# Patient Record
Sex: FEMALE | Race: WHITE | NOT HISPANIC OR LATINO | Employment: FULL TIME | URBAN - METROPOLITAN AREA
[De-identification: names, ages, dates, MRNs, and addresses within clinical notes are randomized per-mention and may not be internally consistent; named-entity substitution may affect disease eponyms.]

---

## 2017-06-06 ENCOUNTER — HOSPITAL ENCOUNTER (OUTPATIENT)
Dept: RADIOLOGY | Facility: MEDICAL CENTER | Age: 47
End: 2017-06-06
Attending: OBSTETRICS & GYNECOLOGY
Payer: COMMERCIAL

## 2017-06-06 DIAGNOSIS — Z12.31 VISIT FOR SCREENING MAMMOGRAM: ICD-10-CM

## 2017-06-06 PROCEDURE — 77063 BREAST TOMOSYNTHESIS BI: CPT

## 2017-10-11 ENCOUNTER — HOSPITAL ENCOUNTER (OUTPATIENT)
Facility: MEDICAL CENTER | Age: 47
End: 2017-10-11
Payer: COMMERCIAL

## 2017-10-11 LAB
25(OH)D3 SERPL-MCNC: 28 NG/ML (ref 30–100)
ALBUMIN SERPL BCP-MCNC: 4.3 G/DL (ref 3.2–4.9)
ALBUMIN/GLOB SERPL: 1.6 G/DL
ALP SERPL-CCNC: 70 U/L (ref 30–99)
ALT SERPL-CCNC: 16 U/L (ref 2–50)
ANION GAP SERPL CALC-SCNC: 8 MMOL/L (ref 0–11.9)
AST SERPL-CCNC: 21 U/L (ref 12–45)
BASOPHILS # BLD AUTO: 0.9 % (ref 0–1.8)
BASOPHILS # BLD: 0.05 K/UL (ref 0–0.12)
BILIRUB SERPL-MCNC: 0.6 MG/DL (ref 0.1–1.5)
BUN SERPL-MCNC: 16 MG/DL (ref 8–22)
CALCIUM SERPL-MCNC: 9.8 MG/DL (ref 8.5–10.5)
CHLORIDE SERPL-SCNC: 102 MMOL/L (ref 96–112)
CHOLEST SERPL-MCNC: 168 MG/DL (ref 100–199)
CO2 SERPL-SCNC: 27 MMOL/L (ref 20–33)
CREAT SERPL-MCNC: 0.85 MG/DL (ref 0.5–1.4)
EOSINOPHIL # BLD AUTO: 0.26 K/UL (ref 0–0.51)
EOSINOPHIL NFR BLD: 4.6 % (ref 0–6.9)
ERYTHROCYTE [DISTWIDTH] IN BLOOD BY AUTOMATED COUNT: 43.9 FL (ref 35.9–50)
GFR SERPL CREATININE-BSD FRML MDRD: >60 ML/MIN/1.73 M 2
GLOBULIN SER CALC-MCNC: 2.7 G/DL (ref 1.9–3.5)
GLUCOSE SERPL-MCNC: 90 MG/DL (ref 65–99)
HCT VFR BLD AUTO: 42.7 % (ref 37–47)
HDLC SERPL-MCNC: 75 MG/DL
HGB BLD-MCNC: 14.2 G/DL (ref 12–16)
IMM GRANULOCYTES # BLD AUTO: 0.01 K/UL (ref 0–0.11)
IMM GRANULOCYTES NFR BLD AUTO: 0.2 % (ref 0–0.9)
LDLC SERPL CALC-MCNC: 82 MG/DL
LYMPHOCYTES # BLD AUTO: 1.8 K/UL (ref 1–4.8)
LYMPHOCYTES NFR BLD: 31.5 % (ref 22–41)
MCH RBC QN AUTO: 32.3 PG (ref 27–33)
MCHC RBC AUTO-ENTMCNC: 33.3 G/DL (ref 33.6–35)
MCV RBC AUTO: 97.3 FL (ref 81.4–97.8)
MONOCYTES # BLD AUTO: 0.45 K/UL (ref 0–0.85)
MONOCYTES NFR BLD AUTO: 7.9 % (ref 0–13.4)
NEUTROPHILS # BLD AUTO: 3.14 K/UL (ref 2–7.15)
NEUTROPHILS NFR BLD: 54.9 % (ref 44–72)
NRBC # BLD AUTO: 0 K/UL
NRBC BLD AUTO-RTO: 0 /100 WBC
PLATELET # BLD AUTO: 259 K/UL (ref 164–446)
PMV BLD AUTO: 10.4 FL (ref 9–12.9)
POTASSIUM SERPL-SCNC: 4.2 MMOL/L (ref 3.6–5.5)
PROT SERPL-MCNC: 7 G/DL (ref 6–8.2)
RBC # BLD AUTO: 4.39 M/UL (ref 4.2–5.4)
SODIUM SERPL-SCNC: 137 MMOL/L (ref 135–145)
TRIGL SERPL-MCNC: 57 MG/DL (ref 0–149)
TSH SERPL DL<=0.005 MIU/L-ACNC: 1.58 UIU/ML (ref 0.3–3.7)
WBC # BLD AUTO: 5.7 K/UL (ref 4.8–10.8)

## 2017-11-03 ENCOUNTER — HOSPITAL ENCOUNTER (OUTPATIENT)
Dept: LAB | Facility: MEDICAL CENTER | Age: 47
End: 2017-11-03
Attending: ANESTHESIOLOGY
Payer: COMMERCIAL

## 2017-11-03 LAB
HCG SERPL QL: NEGATIVE
HCT VFR BLD AUTO: 42.4 % (ref 37–47)

## 2017-11-03 PROCEDURE — 84703 CHORIONIC GONADOTROPIN ASSAY: CPT

## 2017-11-03 PROCEDURE — 36415 COLL VENOUS BLD VENIPUNCTURE: CPT

## 2017-11-03 PROCEDURE — 85014 HEMATOCRIT: CPT

## 2018-03-12 ENCOUNTER — PATIENT OUTREACH (OUTPATIENT)
Dept: HEALTH INFORMATION MANAGEMENT | Facility: OTHER | Age: 48
End: 2018-03-12

## 2018-04-22 ENCOUNTER — OFFICE VISIT (OUTPATIENT)
Dept: URGENT CARE | Facility: CLINIC | Age: 48
End: 2018-04-22
Payer: COMMERCIAL

## 2018-04-22 VITALS
BODY MASS INDEX: 20.14 KG/M2 | OXYGEN SATURATION: 98 % | DIASTOLIC BLOOD PRESSURE: 72 MMHG | SYSTOLIC BLOOD PRESSURE: 112 MMHG | HEIGHT: 64 IN | HEART RATE: 87 BPM | RESPIRATION RATE: 15 BRPM | TEMPERATURE: 99.6 F | WEIGHT: 118 LBS

## 2018-04-22 DIAGNOSIS — R51.9 SINUS HEADACHE: ICD-10-CM

## 2018-04-22 DIAGNOSIS — J01.00 ACUTE MAXILLARY SINUSITIS, RECURRENCE NOT SPECIFIED: ICD-10-CM

## 2018-04-22 PROCEDURE — 99203 OFFICE O/P NEW LOW 30 MIN: CPT | Performed by: NURSE PRACTITIONER

## 2018-04-22 RX ORDER — DOXYCYCLINE HYCLATE 100 MG
100 TABLET ORAL 2 TIMES DAILY
Qty: 20 TAB | Refills: 0 | Status: SHIPPED | OUTPATIENT
Start: 2018-04-22 | End: 2018-05-02

## 2018-04-22 RX ORDER — HYDROCODONE BITARTRATE AND ACETAMINOPHEN 5; 325 MG/1; MG/1
1-2 TABLET ORAL EVERY 6 HOURS PRN
Qty: 10 TAB | Refills: 0 | Status: SHIPPED | OUTPATIENT
Start: 2018-04-22 | End: 2018-04-26

## 2018-04-22 ASSESSMENT — ENCOUNTER SYMPTOMS
FEVER: 0
SINUS PAIN: 1
SINUS PRESSURE: 1
CHILLS: 0

## 2018-04-22 NOTE — PROGRESS NOTES
"Subjective:      Cali Horne is a 47 y.o. female who presents with Sinus Problem (pressure head aches x 1 week )    History reviewed. No pertinent past medical history.  Social History     Social History   • Marital status:      Spouse name: N/A   • Number of children: N/A   • Years of education: N/A     Occupational History   • Not on file.     Social History Main Topics   • Smoking status: Never Smoker   • Smokeless tobacco: Never Used   • Alcohol use Yes      Comment: occ   • Drug use: No   • Sexual activity: Not on file     Other Topics Concern   • Not on file     Social History Narrative   • No narrative on file     History reviewed. No pertinent family history.    Allergies: Penicillins and Sulfa drugs            Sinus Problem   This is a new problem. The current episode started 1 to 4 weeks ago. The problem has been gradually worsening since onset. There has been no fever. Associated symptoms include congestion and sinus pressure. Pertinent negatives include no chills. Past treatments include nothing. The treatment provided no relief.       Review of Systems   Constitutional: Positive for malaise/fatigue. Negative for chills and fever.   HENT: Positive for congestion, sinus pain and sinus pressure.    Skin: Negative.    All other systems reviewed and are negative.         Objective:     /72   Pulse 87   Temp 37.6 °C (99.6 °F)   Resp 15   Ht 1.626 m (5' 4\")   Wt 53.5 kg (118 lb)   SpO2 98%   BMI 20.25 kg/m²      Physical Exam   Constitutional: She is oriented to person, place, and time. She appears well-developed and well-nourished.   HENT:   Head: Normocephalic.   Right Ear: External ear normal.   Left Ear: External ear normal.   Yellow PND  Tender ovet  The maxillary sinuses    Eyes: Conjunctivae and EOM are normal. Pupils are equal, round, and reactive to light.   Neck: Neck supple.   Cardiovascular: Normal rate and regular rhythm.    Pulmonary/Chest: Effort normal and breath sounds " normal.   Musculoskeletal: Normal range of motion.   Neurological: She is alert and oriented to person, place, and time.   Skin: Skin is warm and dry. Capillary refill takes less than 2 seconds.   Psychiatric: She has a normal mood and affect. Her behavior is normal. Judgment and thought content normal.   Vitals reviewed.              Assessment/Plan:     1. Acute maxillary sinusitis, recurrence not specified  2. Sinus headache  -doxycycline  -flonase  -ibuprofen  -mucinex  -norco PRN headache; clearly stated no driving or alcohol with this medication. Checked patient's  and find no evidence of narcotic misuse. Consents signed.  -follow up for persistent or wor seing of symptoms

## 2018-04-22 NOTE — PATIENT INSTRUCTIONS
OTC: mucinex, flonase    Sinusitis, Adult  Sinusitis is soreness and inflammation of your sinuses. Sinuses are hollow spaces in the bones around your face. Your sinuses are located:  · Around your eyes.  · In the middle of your forehead.  · Behind your nose.  · In your cheekbones.  Your sinuses and nasal passages are lined with a stringy fluid (mucus). Mucus normally drains out of your sinuses. When your nasal tissues become inflamed or swollen, the mucus can become trapped or blocked so air cannot flow through your sinuses. This allows bacteria, viruses, and funguses to grow, which leads to infection.  Sinusitis can develop quickly and last for 7?10 days (acute) or for more than 12 weeks (chronic). Sinusitis often develops after a cold.  What are the causes?  This condition is caused by anything that creates swelling in the sinuses or stops mucus from draining, including:  · Allergies.  · Asthma.  · Bacterial or viral infection.  · Abnormally shaped bones between the nasal passages.  · Nasal growths that contain mucus (nasal polyps).  · Narrow sinus openings.  · Pollutants, such as chemicals or irritants in the air.  · A foreign object stuck in the nose.  · A fungal infection. This is rare.  What increases the risk?  The following factors may make you more likely to develop this condition:  · Having allergies or asthma.  · Having had a recent cold or respiratory tract infection.  · Having structural deformities or blockages in your nose or sinuses.  · Having a weak immune system.  · Doing a lot of swimming or diving.  · Overusing nasal sprays.  · Smoking.  What are the signs or symptoms?  The main symptoms of this condition are pain and a feeling of pressure around the affected sinuses. Other symptoms include:  · Upper toothache.  · Earache.  · Headache.  · Bad breath.  · Decreased sense of smell and taste.  · A cough that may get worse at night.  · Fatigue.  · Fever.  · Thick drainage from your nose. The drainage  is often green and it may contain pus (purulent).  · Stuffy nose or congestion.  · Postnasal drip. This is when extra mucus collects in the throat or back of the nose.  · Swelling and warmth over the affected sinuses.  · Sore throat.  · Sensitivity to light.  How is this diagnosed?  This condition is diagnosed based on symptoms, a medical history, and a physical exam. To find out if your condition is acute or chronic, your health care provider may:  · Look in your nose for signs of nasal polyps.  · Tap over the affected sinus to check for signs of infection.  · View the inside of your sinuses using an imaging device that has a light attached (endoscope).  If your health care provider suspects that you have chronic sinusitis, you may also:  · Be tested for allergies.  · Have a sample of mucus taken from your nose (nasal culture) and checked for bacteria.  · Have a mucus sample examined to see if your sinusitis is related to an allergy.  If your sinusitis does not respond to treatment and it lasts longer than 8 weeks, you may have an MRI or CT scan to check your sinuses. These scans also help to determine how severe your infection is.  In rare cases, a bone biopsy may be done to rule out more serious types of fungal sinus disease.  How is this treated?  Treatment for sinusitis depends on the cause and whether your condition is chronic or acute. If a virus is causing your sinusitis, your symptoms will go away on their own within 10 days. You may be given medicines to relieve your symptoms, including:  · Topical nasal decongestants. They shrink swollen nasal passages and let mucus drain from your sinuses.  · Antihistamines. These drugs block inflammation that is triggered by allergies. This can help to ease swelling in your nose and sinuses.  · Topical nasal corticosteroids. These are nasal sprays that ease inflammation and swelling in your nose and sinuses.  · Nasal saline washes. These rinses can help to get rid of  thick mucus in your nose.  If your condition is caused by bacteria, you will be given an antibiotic medicine. If your condition is caused by a fungus, you will be given an antifungal medicine.  Surgery may be needed to correct underlying conditions, such as narrow nasal passages. Surgery may also be needed to remove polyps.  Follow these instructions at home:  Medicines  · Take, use, or apply over-the-counter and prescription medicines only as told by your health care provider. These may include nasal sprays.  · If you were prescribed an antibiotic medicine, take it as told by your health care provider. Do not stop taking the antibiotic even if you start to feel better.  Hydrate and Humidify  · Drink enough water to keep your urine clear or pale yellow. Staying hydrated will help to thin your mucus.  · Use a cool mist humidifier to keep the humidity level in your home above 50%.  · Inhale steam for 10-15 minutes, 3-4 times a day or as told by your health care provider. You can do this in the bathroom while a hot shower is running.  · Limit your exposure to cool or dry air.  Rest  · Rest as much as possible.  · Sleep with your head raised (elevated).  · Make sure to get enough sleep each night.  General instructions  · Apply a warm, moist washcloth to your face 3-4 times a day or as told by your health care provider. This will help with discomfort.  · Wash your hands often with soap and water to reduce your exposure to viruses and other germs. If soap and water are not available, use hand .  · Do not smoke. Avoid being around people who are smoking (secondhand smoke).  · Keep all follow-up visits as told by your health care provider. This is important.  Contact a health care provider if:  · You have a fever.  · Your symptoms get worse.  · Your symptoms do not improve within 10 days.  Get help right away if:  · You have a severe headache.  · You have persistent vomiting.  · You have pain or swelling around  your face or eyes.  · You have vision problems.  · You develop confusion.  · Your neck is stiff.  · You have trouble breathing.  This information is not intended to replace advice given to you by your health care provider. Make sure you discuss any questions you have with your health care provider.  Document Released: 12/18/2006 Document Revised: 08/13/2017 Document Reviewed: 10/12/2016  Elsevier Interactive Patient Education © 2017 [employer]

## 2018-04-26 ENCOUNTER — OFFICE VISIT (OUTPATIENT)
Dept: MEDICAL GROUP | Facility: MEDICAL CENTER | Age: 48
End: 2018-04-26
Payer: COMMERCIAL

## 2018-04-26 VITALS
HEIGHT: 64 IN | SYSTOLIC BLOOD PRESSURE: 102 MMHG | RESPIRATION RATE: 18 BRPM | BODY MASS INDEX: 19.72 KG/M2 | HEART RATE: 72 BPM | OXYGEN SATURATION: 98 % | WEIGHT: 115.52 LBS | TEMPERATURE: 97.2 F | DIASTOLIC BLOOD PRESSURE: 70 MMHG

## 2018-04-26 DIAGNOSIS — F32.4 MAJOR DEPRESSIVE DISORDER WITH SINGLE EPISODE, IN PARTIAL REMISSION (HCC): ICD-10-CM

## 2018-04-26 DIAGNOSIS — G47.00 INSOMNIA, UNSPECIFIED TYPE: ICD-10-CM

## 2018-04-26 DIAGNOSIS — Z78.0 MENOPAUSE: ICD-10-CM

## 2018-04-26 DIAGNOSIS — F41.9 ANXIETY: ICD-10-CM

## 2018-04-26 DIAGNOSIS — Z85.6 HISTORY OF LEUKEMIA: ICD-10-CM

## 2018-04-26 DIAGNOSIS — J01.00 ACUTE NON-RECURRENT MAXILLARY SINUSITIS: ICD-10-CM

## 2018-04-26 DIAGNOSIS — Z00.00 HEALTHCARE MAINTENANCE: ICD-10-CM

## 2018-04-26 PROBLEM — F32.9 MAJOR DEPRESSIVE DISORDER: Status: ACTIVE | Noted: 2018-04-26

## 2018-04-26 PROCEDURE — 99214 OFFICE O/P EST MOD 30 MIN: CPT | Performed by: FAMILY MEDICINE

## 2018-04-26 RX ORDER — ESTRADIOL 0.1 MG/G
CREAM VAGINAL
COMMUNITY
Start: 2018-04-09

## 2018-04-26 RX ORDER — ALPRAZOLAM 0.25 MG/1
0.25 TABLET ORAL EVERY 12 HOURS
Qty: 30 TAB | Refills: 1 | Status: CANCELLED | OUTPATIENT
Start: 2018-04-26

## 2018-04-26 RX ORDER — ESOMEPRAZOLE MAGNESIUM 40 MG/1
40 CAPSULE, DELAYED RELEASE ORAL
COMMUNITY
End: 2018-09-11 | Stop reason: SDUPTHER

## 2018-04-26 RX ORDER — LORAZEPAM 0.5 MG/1
0.5 TABLET ORAL
Qty: 20 TAB | Refills: 0 | Status: SHIPPED | OUTPATIENT
Start: 2018-04-26 | End: 2018-05-16

## 2018-04-26 RX ORDER — ALPRAZOLAM 0.25 MG/1
0.25 TABLET ORAL EVERY 12 HOURS
COMMUNITY
End: 2018-04-26

## 2018-04-26 ASSESSMENT — PATIENT HEALTH QUESTIONNAIRE - PHQ9: CLINICAL INTERPRETATION OF PHQ2 SCORE: 0

## 2018-04-26 NOTE — LETTER
[facility]  Tapan Palma M.D.  [address]  [place] [address]  Fax: [phone]   Authorization for Release/Disclosure of   Protected Health Information   Name: ANNABELLA SANTACRUZ : 1970 SSN: [id number]   Address: [place] [address] Phone:    [phone] (home) [phone] (work)   I authorize the entity listed below to release/disclose the PHI below to:   [facility]/Tapan Palma M.D. and Tapan Palma M.D.   Provider or Entity Name:     Address   City, State, Zip   Phone:      Fax:     Reason for request: continuity of care   Information to be released:    [  ] LAST COLONOSCOPY,  including any PATH REPORT and follow-up  [  ] LAST FIT/COLOGUARD RESULT [  ] LAST DEXA  [  ] LAST MAMMOGRAM  [  ] LAST PAP  [  ] LAST LABS [  ] RETINA EXAM REPORT  [  ] IMMUNIZATION RECORDS  [  ] Release all info      [  ] Check here and initial the line next to each item to release ALL health information INCLUDING  _____ Care and treatment for drug and / or alcohol abuse  _____ HIV testing, infection status, or AIDS  _____ Genetic Testing    DATES OF SERVICE OR TIME PERIOD TO BE DISCLOSED: _____________  I understand and acknowledge that:  * This Authorization may be revoked at any time by you in writing, except if your health information has already been used or disclosed.  * Your health information that will be used or disclosed as a result of you signing this authorization could be re-disclosed by the recipient. If this occurs, your re-disclosed health information may no longer be protected by State or Federal laws.  * You may refuse to sign this Authorization. Your refusal will not affect your ability to obtain treatment.  * This Authorization becomes effective upon signing and will  on (date) __________.      If no date is indicated, this Authorization will  one (1) year from the signature date.    Name: Annabella Santacruz    Signature:   Date:     2018            PLEASE FAX REQUESTED RECORDS BACK TO: [phone]

## 2018-04-26 NOTE — PROGRESS NOTES
"[facility]  Progress Note  Established Patient    Subjective:   Cali Horne is a 47 y.o. female here today with a chief complaint of sinusitis. The patient is alone.     History of leukemia  The patient has a history of leukemia. She states that she is status post chemotherapy and doing well. She was being followed by [facility] but has been released from their care.    Menopause  Patient has gone through menopause. She has no more hot flashes or night sweats, however, she does have some atrophic vaginitis which responds well to Estrace cream.    Healthcare maintenance  Lipids: Done in 2017 and normal.  Fasting Glucose: Done in 2017 and normal.  Colonoscopy: Due at age 49.  Mammogram: Done 2017 and BiRADS2.   Pap: done by Dr. Prater.    Tdap: declines.    Acute non-recurrent maxillary sinusitis  The patient was seen in  on 4/22 for sinus pressure. She was diagnosed with sinusitis and prescribed doxycycline, flonase, ibuprofen, mucinex and norco for an associated headache. The patient tried the Norco but could not tolerate because of GI upset. She is not currently taking it. Patient is now only taking doxycycline, noting that her symptoms are starting to improve. She does note some continued right sinus congestion.    Anxiety  Patient has anxiety which is well controlled on Zoloft 50 mg daily. On occasion, she requires Xanax 0.25 mg for bouts of anxiety.    Insomnia  Patient has sleep maintenance insomnia and takes Xanax when she wakes up. This helps her fall asleep but she doesn't like to take it because it makes her \"grouchy\" the next morning. She has used melatonin but this gave her a headache.    Major depressive disorder  Patient has depression well controlled with Zoloft 50 mg daily. She denies suicidal ideation.       Current Outpatient Prescriptions on File Prior to Visit   Medication Sig Dispense Refill   • doxycycline (VIBRAMYCIN) 100 MG Tab Take 1 Tab by mouth 2 times a day for 10 days. 20 Tab 0 " "    No current facility-administered medications on file prior to visit.        Past Medical History:   Diagnosis Date   • Allergy    • Anxiety    • Blood transfusion without reported diagnosis    • Cancer (CMS-HCC)    • Depression        Allergies: Penicillins and Sulfa drugs    Surgical History:  has a past surgical history that includes open reduction and mammoplasty augmentation.    Family History: family history includes Alcohol/Drug in her sister; Hyperlipidemia in her father; Hypertension in her father.    Social History:  reports that she has never smoked. She has never used smokeless tobacco. She reports that she drinks alcohol. She reports that she does not use drugs.    ROS: no CP or SOB.        Objective:     Vitals:    04/26/18 0805   BP: 102/70   Pulse: 72   Resp: 18   Temp: 36.2 °C (97.2 °F)   SpO2: 98%   Weight: 52.4 kg (115 lb 8.3 oz)   Height: 1.626 m (5' 4\")       Physical Exam:  General: alert in no apparent distress.   Cardio: regular rate and rhythm, no murmurs, rubs or gallops.   Resp: CTAB no w/r/r.   ENMT: Hypertrophic nasal turbinates bilaterally. Tympanic membranes normal bilaterally. No pharyngeal erythema.        Assessment and Plan:     1. Anxiety  Good control with rare benzodiazepine use. The patient is not currently taking Norco. Her last Xanax refill was on January 8. Discussed hydroxyzine. Patient is concerned it may make her groggy and would like to stay in the same class as Xanax but is willing to try Ativan. The patient was counseled on appropriate use of Ativan and was advised to only take the medicine as prescribed. The patient was advised of the risk of sedation and the importance of not taking this medicine with other sedating medicines or alcohol. The patient was counseled not to drive on this medicine.  checked and appropriate. Advised not to take with Norco.  - LORazepam (ATIVAN) 0.5 MG Tab; Take 1 Tab by mouth 1 time daily as needed for Anxiety (or sleep) for up to 20 " days.  Dispense: 20 Tab; Refill: 0  - sertraline (ZOLOFT) 50 MG Tab; Take 1 Tab by mouth every day.  Dispense: 90 Tab; Refill: 3    2. Acute non-recurrent maxillary sinusitis  - finish doxycycline.   - [facility]Med sinus rinse followed by flonase.   - return with new, worsening or persistent symptoms.     3. History of leukemia  CBC normal.   - Records requested from Dr. Jarquin.    4. Menopause  - continue estrace cream.     5. Healthcare maintenance  - see HPI.     6. Major depressive disorder with single episode, in partial remission (CMS-[facility])  - sertraline (ZOLOFT) 50 MG Tab; Take 1 Tab by mouth every day.  Dispense: 90 Tab; Refill: 3    7. Insomnia, unspecified type  - discussed sleep hygiene, handout given.   - Ativan as needed up to once per day.        Followup: Return in about 6 weeks (around 6/7/2018).

## 2018-04-26 NOTE — ASSESSMENT & PLAN NOTE
Lipids: Done in 2017 and normal.  Fasting Glucose: Done in 2017 and normal.  Colonoscopy: Due at age 49.  Mammogram: Done 2017 and BiRADS2.   Pap: done by Dr. Prater.    Tdap: declines.

## 2018-04-26 NOTE — ASSESSMENT & PLAN NOTE
The patient was seen in [facility] on 4/22 for sinus pressure. She was diagnosed with sinusitis and prescribed doxycycline, flonase, ibuprofen, mucinex and norco for an associated headache. The patient tried the Norco but could not tolerate because of GI upset. She is not currently taking it. Patient is now only taking doxycycline, noting that her symptoms are starting to improve. She does note some continued right sinus congestion.

## 2018-04-26 NOTE — ASSESSMENT & PLAN NOTE
The patient has a history of leukemia. She states that she is status post chemotherapy and doing well. She was being followed by [facility] but has been released from their care.

## 2018-04-26 NOTE — ASSESSMENT & PLAN NOTE
"Patient has sleep maintenance insomnia and takes Xanax when she wakes up. This helps her fall asleep but she doesn't like to take it because it makes her \"grouchy\" the next morning. She has used melatonin but this gave her a headache.  "

## 2018-04-26 NOTE — ASSESSMENT & PLAN NOTE
Patient has gone through menopause. She has no more hot flashes or night sweats, however, she does have some atrophic vaginitis which responds well to Estrace cream.

## 2018-04-26 NOTE — ASSESSMENT & PLAN NOTE
Patient has anxiety which is well controlled on Zoloft 50 mg daily. On occasion, she requires Xanax 0.25 mg for bouts of anxiety.

## 2018-05-01 ENCOUNTER — OFFICE VISIT (OUTPATIENT)
Dept: URGENT CARE | Facility: CLINIC | Age: 48
End: 2018-05-01
Payer: COMMERCIAL

## 2018-05-01 VITALS
TEMPERATURE: 98.5 F | DIASTOLIC BLOOD PRESSURE: 70 MMHG | HEIGHT: 64 IN | BODY MASS INDEX: 20.4 KG/M2 | WEIGHT: 119.49 LBS | OXYGEN SATURATION: 96 % | HEART RATE: 89 BPM | RESPIRATION RATE: 16 BRPM | SYSTOLIC BLOOD PRESSURE: 100 MMHG

## 2018-05-01 DIAGNOSIS — J11.1 INFLUENZA: ICD-10-CM

## 2018-05-01 DIAGNOSIS — J30.9 ALLERGIC RHINITIS, UNSPECIFIED SEASONALITY, UNSPECIFIED TRIGGER: ICD-10-CM

## 2018-05-01 LAB
FLUAV+FLUBV AG SPEC QL IA: NORMAL
INT CON NEG: NORMAL
INT CON POS: NORMAL

## 2018-05-01 PROCEDURE — 87804 INFLUENZA ASSAY W/OPTIC: CPT | Performed by: FAMILY MEDICINE

## 2018-05-01 PROCEDURE — 99214 OFFICE O/P EST MOD 30 MIN: CPT | Performed by: FAMILY MEDICINE

## 2018-05-01 RX ORDER — OSELTAMIVIR PHOSPHATE 75 MG/1
75 CAPSULE ORAL 2 TIMES DAILY
Qty: 10 CAP | Refills: 0 | Status: SHIPPED | OUTPATIENT
Start: 2018-05-01 | End: 2018-06-08

## 2018-05-01 ASSESSMENT — ENCOUNTER SYMPTOMS
HEADACHES: 1
SORE THROAT: 1
COUGH: 1
CHILLS: 1
SINUS PRESSURE: 1

## 2018-05-01 NOTE — PROGRESS NOTES
"  Subjective:   Cali Quintanilla a 47 y.o. female who presents for Sinus Problem    Sinus Problem   This is a new problem. The current episode started in the past 7 days. The problem has been rapidly worsening since onset. Maximum temperature: Unmeasured. The pain is moderate. Associated symptoms include chills, congestion, coughing, headaches, sinus pressure and a sore throat.     Review of Systems   Constitutional: Positive for chills.   HENT: Positive for congestion, sinus pressure and sore throat.    Respiratory: Positive for cough.    Neurological: Positive for headaches.     Allergies   Allergen Reactions   • Penicillins Rash     rash   • Sulfa Drugs Rash     Rash      Objective:   /70   Pulse 89   Temp 36.9 °C (98.5 °F)   Resp 16   Ht 1.626 m (5' 4\")   Wt 54.2 kg (119 lb 7.8 oz)   SpO2 96%   BMI 20.51 kg/m²   Physical Exam   Constitutional: She is oriented to person, place, and time. She appears well-developed and well-nourished. No distress.   HENT:   Head: Normocephalic and atraumatic.   Right Ear: External ear normal.   Left Ear: External ear normal.   Nose: Mucosal edema and rhinorrhea present. No sinus tenderness. Right sinus exhibits no maxillary sinus tenderness and no frontal sinus tenderness. Left sinus exhibits no maxillary sinus tenderness and no frontal sinus tenderness.   Mouth/Throat: Uvula is midline. Posterior oropharyngeal edema and posterior oropharyngeal erythema present. No oropharyngeal exudate or tonsillar abscesses.   Eyes: Conjunctivae and EOM are normal. Pupils are equal, round, and reactive to light.   Cardiovascular: Normal rate, regular rhythm, normal heart sounds and intact distal pulses.    No murmur heard.  Pulmonary/Chest: Effort normal and breath sounds normal. No respiratory distress.   Abdominal: Soft. Bowel sounds are normal. She exhibits no distension. There is no tenderness.   Musculoskeletal: Normal range of motion.   Neurological: She is alert and oriented " to person, place, and time. She has normal reflexes. No sensory deficit.   Skin: Skin is warm and dry.   Psychiatric: She has a normal mood and affect. Her behavior is normal.     Assessment/Plan:   Assessment    1. Influenza  - POCT Influenza A/B  - oseltamivir (TAMIFLU) 75 MG Cap; Take 1 Cap by mouth 2 times a day.  Dispense: 10 Cap; Refill: 0    2. Allergic rhinitis, unspecified seasonality, unspecified trigger    Differential diagnosis, natural history, supportive care, and indications for immediate follow-up discussed.  Return if symptoms worsen or fail to improve, for Short.

## 2018-06-08 ENCOUNTER — OFFICE VISIT (OUTPATIENT)
Dept: MEDICAL GROUP | Facility: MEDICAL CENTER | Age: 48
End: 2018-06-08
Payer: COMMERCIAL

## 2018-06-08 VITALS
OXYGEN SATURATION: 98 % | BODY MASS INDEX: 19.95 KG/M2 | TEMPERATURE: 97.5 F | DIASTOLIC BLOOD PRESSURE: 70 MMHG | HEART RATE: 70 BPM | HEIGHT: 64 IN | WEIGHT: 116.84 LBS | SYSTOLIC BLOOD PRESSURE: 118 MMHG

## 2018-06-08 DIAGNOSIS — F32.4 MAJOR DEPRESSIVE DISORDER WITH SINGLE EPISODE, IN PARTIAL REMISSION (HCC): ICD-10-CM

## 2018-06-08 DIAGNOSIS — F41.9 ANXIETY: ICD-10-CM

## 2018-06-08 DIAGNOSIS — G47.00 INSOMNIA, UNSPECIFIED TYPE: ICD-10-CM

## 2018-06-08 PROBLEM — J01.00 ACUTE NON-RECURRENT MAXILLARY SINUSITIS: Status: RESOLVED | Noted: 2018-04-26 | Resolved: 2018-06-08

## 2018-06-08 PROCEDURE — 99214 OFFICE O/P EST MOD 30 MIN: CPT | Performed by: FAMILY MEDICINE

## 2018-06-08 RX ORDER — LORAZEPAM 0.5 MG/1
0.5 TABLET ORAL
Qty: 30 TAB | Refills: 1 | Status: SHIPPED | OUTPATIENT
Start: 2018-06-08 | End: 2018-07-08

## 2018-06-08 NOTE — ASSESSMENT & PLAN NOTE
The patient's anxiety is under good control with Zoloft 50 mg daily. She also transitioned from Xanax to lorazepam. She states she does better on the lorazepam for acute periods of Anxiety with less depressive symptoms. She is only using this on occasion. Patient does have some sexual side effects from the Zoloft but is reluctant to change this medication to another SSRI or Wellbutrin.

## 2018-06-08 NOTE — PROGRESS NOTES
[facility]  Progress Note  Established Patient    Subjective:   Cali Horne is a 47 y.o. female here today with a chief complaint of anxiety. The patient is alone.     Anxiety  The patient's anxiety is under good control with Zoloft 50 mg daily. She also transitioned from Xanax to lorazepam. She states she does better on the lorazepam for acute periods of Anxiety with less depressive symptoms. She is only using this on occasion. Patient does have some sexual side effects from the Zoloft but is reluctant to change this medication to another SSRI or Wellbutrin.    Insomnia  The patient's insomnia is doing very well. She is implementing sleep hygiene recommendations and is very rarely using Xanax.    Major depressive disorder  Patient's depression is well-controlled on Zoloft 50 mg daily. She does report some sexual side effects but is unwilling to change this to another SSRI, to decrease the dose of Zoloft or transition to Wellbutrin. The patient denies suicidal ideation.      Current Outpatient Prescriptions on File Prior to Visit   Medication Sig Dispense Refill   • estradiol (ESTRACE) 0.1 MG/GM vaginal cream      • esomeprazole (NEXIUM) 40 MG delayed-release capsule Take 40 mg by mouth every morning before breakfast.     • sertraline (ZOLOFT) 50 MG Tab Take 1 Tab by mouth every day. 90 Tab 3     No current facility-administered medications on file prior to visit.        Past Medical History:   Diagnosis Date   • Allergy    • Anxiety    • Blood transfusion without reported diagnosis    • Cancer (HCC)    • Depression        Allergies: Penicillins and Sulfa drugs    Surgical History:  has a past surgical history that includes open reduction and mammoplasty augmentation.    Family History: family history includes Alcohol/Drug in her sister; Hyperlipidemia in her father; Hypertension in her father.    Social History:  reports that she has never smoked. She has never used smokeless tobacco. She reports that  "she drinks alcohol. She reports that she does not use drugs.    ROS: no CP or SOB.        Objective:     Vitals:    06/08/18 0831   BP: 118/70   Pulse: 70   Temp: 36.4 °C (97.5 °F)   SpO2: 98%   Weight: 53 kg (116 lb 13.5 oz)   Height: 1.626 m (5' 4\")       Physical Exam:  General: alert in no apparent distress.   Gait: normal.         Assessment and Plan:     1. Anxiety  The patient was counseled on appropriate use of Ativan and was advised to only take the medicine as prescribed. The patient is aware of the risk of sedation and the importance of not taking this medicine with other sedating medicines or alcohol. The patient is aware not to drive on this medicine. The patient is aware of the risks, benefits and alternatives to this medicine.  checked and appropriate.   - continue Zoloft 50 mg daily.   - LORazepam (ATIVAN) 0.5 MG Tab; Take 1 Tab by mouth 1 time daily as needed for Anxiety for up to 30 days.  Dispense: 30 Tab; Refill: 1    2. Insomnia, unspecified type  - continue sleep hygiene with rare PRN ativan.     3. Major depressive disorder with single episode, in partial remission (HCC)  - continue zoloft.         Followup: Return in about 4 months (around 10/8/2018), or if symptoms worsen or fail to improve.         "

## 2018-06-08 NOTE — ASSESSMENT & PLAN NOTE
The patient's insomnia is doing very well. She is implementing sleep hygiene recommendations and is very rarely using Xanax.

## 2018-06-08 NOTE — LETTER
[facility]  Tapan Palma M.D.  [address]  [place] [address]  Fax: [phone]   Authorization for Release/Disclosure of   Protected Health Information   Name: ANNABELLA SANTACRUZ : 1970 SSN: [id number]   Address: [place] [address] Phone:    [phone] (home) [phone] (work)   I authorize the entity listed below to release/disclose the PHI below to:   [facility]/Tapan Palma M.D. and Tapan Palma M.D.   Provider or Entity Name:     Address   City, State, Zip   Phone:      Fax:     Reason for request: continuity of care   Information to be released:    [  ] LAST COLONOSCOPY,  including any PATH REPORT and follow-up  [  ] LAST FIT/COLOGUARD RESULT [  ] LAST DEXA  [  ] LAST MAMMOGRAM  [  ] LAST PAP  [  ] LAST LABS [  ] RETINA EXAM REPORT  [  ] IMMUNIZATION RECORDS  [  ] Release all info      [  ] Check here and initial the line next to each item to release ALL health information INCLUDING  _____ Care and treatment for drug and / or alcohol abuse  _____ HIV testing, infection status, or AIDS  _____ Genetic Testing    DATES OF SERVICE OR TIME PERIOD TO BE DISCLOSED: _____________  I understand and acknowledge that:  * This Authorization may be revoked at any time by you in writing, except if your health information has already been used or disclosed.  * Your health information that will be used or disclosed as a result of you signing this authorization could be re-disclosed by the recipient. If this occurs, your re-disclosed health information may no longer be protected by State or Federal laws.  * You may refuse to sign this Authorization. Your refusal will not affect your ability to obtain treatment.  * This Authorization becomes effective upon signing and will  on (date) __________.      If no date is indicated, this Authorization will  one (1) year from the signature date.    Name: Annabella Santacruz    Signature:   Date:     2018            PLEASE FAX REQUESTED RECORDS BACK TO: [phone]

## 2018-06-08 NOTE — ASSESSMENT & PLAN NOTE
Patient's depression is well-controlled on Zoloft 50 mg daily. She does report some sexual side effects but is unwilling to change this to another SSRI, to decrease the dose of Zoloft or transition to Wellbutrin. The patient denies suicidal ideation.

## 2018-06-15 ENCOUNTER — HOSPITAL ENCOUNTER (OUTPATIENT)
Dept: RADIOLOGY | Facility: MEDICAL CENTER | Age: 48
End: 2018-06-15
Attending: OBSTETRICS & GYNECOLOGY
Payer: COMMERCIAL

## 2018-06-15 DIAGNOSIS — Z12.31 ENCOUNTER FOR SCREENING MAMMOGRAM FOR MALIGNANT NEOPLASM OF BREAST: ICD-10-CM

## 2018-06-15 PROCEDURE — 77067 SCR MAMMO BI INCL CAD: CPT

## 2018-09-11 ENCOUNTER — OFFICE VISIT (OUTPATIENT)
Dept: MEDICAL GROUP | Facility: MEDICAL CENTER | Age: 48
End: 2018-09-11
Payer: COMMERCIAL

## 2018-09-11 VITALS
HEART RATE: 76 BPM | OXYGEN SATURATION: 98 % | SYSTOLIC BLOOD PRESSURE: 110 MMHG | TEMPERATURE: 97.1 F | DIASTOLIC BLOOD PRESSURE: 76 MMHG | BODY MASS INDEX: 21.04 KG/M2 | RESPIRATION RATE: 18 BRPM | WEIGHT: 123.24 LBS | HEIGHT: 64 IN

## 2018-09-11 DIAGNOSIS — F41.9 ANXIETY: ICD-10-CM

## 2018-09-11 DIAGNOSIS — R12 HEARTBURN: ICD-10-CM

## 2018-09-11 DIAGNOSIS — G47.00 INSOMNIA, UNSPECIFIED TYPE: ICD-10-CM

## 2018-09-11 DIAGNOSIS — F32.4 MAJOR DEPRESSIVE DISORDER WITH SINGLE EPISODE, IN PARTIAL REMISSION (HCC): ICD-10-CM

## 2018-09-11 PROCEDURE — 99214 OFFICE O/P EST MOD 30 MIN: CPT | Performed by: FAMILY MEDICINE

## 2018-09-11 RX ORDER — ALPRAZOLAM 0.25 MG/1
0.25 TABLET ORAL NIGHTLY PRN
Qty: 30 TAB | Refills: 1 | Status: SHIPPED | OUTPATIENT
Start: 2018-09-11 | End: 2018-10-11

## 2018-09-11 RX ORDER — ESOMEPRAZOLE MAGNESIUM 40 MG/1
40 CAPSULE, DELAYED RELEASE ORAL
Qty: 30 CAP | Refills: 1 | Status: SHIPPED | OUTPATIENT
Start: 2018-09-11

## 2018-09-11 NOTE — PROGRESS NOTES
"[facility]  Progress Note  Established Patient    Subjective:   Cali Horne is a 47 y.o. female here today with a chief complaint of depression. The patient is alone.     Major depressive disorder  The patient's depression is well-controlled with zoloft. She denies SI.     Anxiety  The patient's anxiety is well controlled with zoloft. She used lorazepam and this helped for anxiety but is requesting Xanax as this was effective for both anxiety and sleep. She rarely uses benzodiazepines.     Insomnia  Patient has sleep maintenance insomnia that responds well to Xanax. She states that the Ativan didn't help too much. She is requesting to go back to Xanax. She uses this medicine rarely.     Heartburn  The patient gets occasional epigastric burning pain that responds well to nexium. She is requesting a refill.       Current Outpatient Prescriptions on File Prior to Visit   Medication Sig Dispense Refill   • sertraline (ZOLOFT) 50 MG Tab Take 1 Tab by mouth every day. 90 Tab 3   • estradiol (ESTRACE) 0.1 MG/GM vaginal cream        No current facility-administered medications on file prior to visit.        Past Medical History:   Diagnosis Date   • Allergy    • Anxiety    • Blood transfusion without reported diagnosis    • Cancer (HCC)    • Depression        Allergies: Penicillins and Sulfa drugs    Surgical History:  has a past surgical history that includes open reduction and mammoplasty augmentation.    Family History: family history includes Alcohol/Drug in her sister; Hyperlipidemia in her father; Hypertension in her father.    Social History:  reports that she has never smoked. She has never used smokeless tobacco. She reports that she drinks alcohol. She reports that she does not use drugs.    ROS: no CP or SOB.        Objective:     Vitals:    09/11/18 1246   BP: 110/76   Pulse: 76   Resp: 18   Temp: 36.2 °C (97.1 °F)   SpO2: 98%   Weight: 55.9 kg (123 lb 3.8 oz)   Height: 1.626 m (5' 4\")       Physical " Exam:  General: alert in no apparent distress.   Affect: normal.   Mood: anxious and depressed.         Assessment and Plan:     1. Heartburn  - esomeprazole (NEXIUM) 40 MG delayed-release capsule; Take 1 Cap by mouth 1 time daily as needed (heartburn).  Dispense: 30 Cap; Refill: 1    2. Anxiety  The patient was counseled on appropriate use of Xanax and was advised to only take the medicine as prescribed. The patient was advised of the risk of sedation and the importance of not taking this medicine with other sedating medicines or alcohol. The patient was counseled not to drive on this medicine.  checked and appropriate.   - ALPRAZolam (XANAX) 0.25 MG Tab; Take 1 Tab by mouth at bedtime as needed for Sleep or Anxiety for up to 30 days.  Dispense: 30 Tab; Refill: 1    3. Insomnia, unspecified type  - sleep hygiene.   - ALPRAZolam (XANAX) 0.25 MG Tab; Take 1 Tab by mouth at bedtime as needed for Sleep or Anxiety for up to 30 days.  Dispense: 30 Tab; Refill: 1    4. Major depressive disorder with single episode, in partial remission (HCC)  - continue Zoloft.         Followup: Return in about 6 months (around 3/11/2019), or if symptoms worsen or fail to improve.

## 2018-09-11 NOTE — ASSESSMENT & PLAN NOTE
The patient's anxiety is well controlled with zoloft. She used lorazepam and this helped for anxiety but is requesting Xanax as this was effective for both anxiety and sleep. She rarely uses benzodiazepines.

## 2018-09-11 NOTE — ASSESSMENT & PLAN NOTE
The patient gets occasional epigastric burning pain that responds well to nexium. She is requesting a refill.

## 2018-09-11 NOTE — ASSESSMENT & PLAN NOTE
Patient has sleep maintenance insomnia that responds well to Xanax. She states that the Ativan didn't help too much. She is requesting to go back to Xanax. She uses this medicine rarely.

## 2018-11-13 ENCOUNTER — TELEPHONE (OUTPATIENT)
Dept: MEDICAL GROUP | Facility: MEDICAL CENTER | Age: 48
End: 2018-11-13

## 2021-04-20 DIAGNOSIS — Z00.6 RESEARCH STUDY PATIENT: ICD-10-CM

## 2022-01-10 ENCOUNTER — OFFICE VISIT (OUTPATIENT)
Dept: MEDICAL GROUP | Facility: OTHER | Age: 52
End: 2022-01-10
Payer: COMMERCIAL

## 2022-01-10 VITALS
HEIGHT: 64 IN | SYSTOLIC BLOOD PRESSURE: 106 MMHG | BODY MASS INDEX: 22.02 KG/M2 | OXYGEN SATURATION: 95 % | TEMPERATURE: 98.2 F | WEIGHT: 129 LBS | HEART RATE: 77 BPM | DIASTOLIC BLOOD PRESSURE: 72 MMHG

## 2022-01-10 DIAGNOSIS — Z78.0 MENOPAUSE: ICD-10-CM

## 2022-01-10 DIAGNOSIS — G47.09 OTHER INSOMNIA: ICD-10-CM

## 2022-01-10 DIAGNOSIS — F41.8 OTHER SPECIFIED ANXIETY DISORDERS: ICD-10-CM

## 2022-01-10 PROBLEM — K21.9 GASTROESOPHAGEAL REFLUX DISEASE: Status: ACTIVE | Noted: 2019-11-14

## 2022-01-10 PROCEDURE — 99214 OFFICE O/P EST MOD 30 MIN: CPT | Performed by: FAMILY MEDICINE

## 2022-01-10 RX ORDER — ALPRAZOLAM 0.25 MG/1
TABLET ORAL
COMMUNITY
Start: 2021-11-22 | End: 2022-01-10 | Stop reason: SDUPTHER

## 2022-01-10 RX ORDER — ALPRAZOLAM 0.25 MG/1
0.25 TABLET ORAL NIGHTLY PRN
Qty: 30 TABLET | Refills: 2 | Status: SHIPPED | OUTPATIENT
Start: 2022-01-10 | End: 2022-02-09

## 2022-01-10 ASSESSMENT — ENCOUNTER SYMPTOMS: CONSTITUTIONAL NEGATIVE: 1

## 2022-01-10 NOTE — PROGRESS NOTES
" Subjective:   Cali Horne is a 51 y.o. female here for the evaluation and management of Follow-Up (Xanax Refill Request)    Anxiety-stable and well controlled on current medications    Insomnia-managing fairly well with intermittent use of Xanax, she reports last week she slept well without needing the Xanax but does need it occasionally    Menopause-discussed management of menopausal symptoms including use of supplements and hormone replacement therapy    Long discussion about Covid vaccines and my recommendation to obtain the vaccine  Problem   Gastroesophageal Reflux Disease   Anxiety Disorder       Review of Systems   Constitutional: Negative.        Current Outpatient Medications   Medication Sig Dispense Refill   • sertraline (ZOLOFT) 50 MG Tab TAKE 1 TABLET BY MOUTH DAILY 90 Tab 4   • esomeprazole (NEXIUM) 40 MG delayed-release capsule Take 1 Cap by mouth 1 time daily as needed (heartburn). 30 Cap 1   • estradiol (ESTRACE) 0.1 MG/GM vaginal cream        No current facility-administered medications for this visit.     Allergies  Penicillins and Sulfa drugs    Past Medical History:   Diagnosis Date   • Allergy    • Anxiety    • Blood transfusion without reported diagnosis    • Cancer (HCC)    • Depression      Patient Active Problem List    Diagnosis Date Noted   • Gastroesophageal reflux disease 11/14/2019   • Heartburn 09/11/2018   • Major depressive disorder 04/26/2018   • Anxiety disorder 04/26/2018   • History of leukemia 04/26/2018   • Menopause 04/26/2018   • Healthcare maintenance 04/26/2018   • Insomnia 04/26/2018       Past Surgical History  Past Surgical History:   Procedure Laterality Date   • MAMMOPLASTY AUGMENTATION     • OPEN REDUCTION          Objective:     Vitals:    01/10/22 1002   BP: 106/72   Pulse: 77   Temp: 36.8 °C (98.2 °F)   SpO2: 95%   Weight: 58.5 kg (129 lb)   Height: 1.626 m (5' 4\")     Body mass index is 22.14 kg/m².     Physical Exam  Constitutional:       Appearance: Normal " appearance.   HENT:      Head: Normocephalic.   Eyes:      Extraocular Movements: Extraocular movements intact.      Conjunctiva/sclera: Conjunctivae normal.   Cardiovascular:      Rate and Rhythm: Normal rate and regular rhythm.      Heart sounds: Normal heart sounds.   Pulmonary:      Effort: Pulmonary effort is normal.      Breath sounds: Normal breath sounds.   Skin:     General: Skin is warm and dry.   Neurological:      Mental Status: She is alert and oriented to person, place, and time. Mental status is at baseline.   Psychiatric:         Mood and Affect: Mood normal.         Behavior: Behavior normal.         Assessment and Plan:   Cali Horne is a 51 y.o. female with a Follow-Up (Xanax Refill Request)     The following was discussed with the patient today.    Problem List Items Addressed This Visit     None        Medications reviewed and refills provided, informed written consent for ongoing use of Xanax as needed, confirmed previous colonoscopy and laboratory studies done within a year, follow-up in 3 months followup: No follow-ups on file.    Octavio Jarquin M.D.    Please note that this dictation was created using voice recognition software. I have made every reasonable attempt to correct obvious errors, but I expect that there are errors of grammar and possibly content that I did not discover before finalizing the note.

## 2022-08-12 ENCOUNTER — APPOINTMENT (OUTPATIENT)
Dept: MEDICAL GROUP | Facility: OTHER | Age: 52
End: 2022-08-12
Payer: COMMERCIAL

## 2022-08-12 NOTE — TELEPHONE ENCOUNTER
Appointment needed.  
Patient called and would like to know if you would order acyclovir for cold sores for her  
Phone Number Called: [phone] (home) [phone] (work)    Message: I called and spoke with pt, pt states script is no longer needed, she received a prescription from her other doctor          
Local

## 2022-08-25 ENCOUNTER — APPOINTMENT (OUTPATIENT)
Dept: MEDICAL GROUP | Facility: OTHER | Age: 52
End: 2022-08-25
Payer: COMMERCIAL

## 2022-09-19 ENCOUNTER — OFFICE VISIT (OUTPATIENT)
Dept: MEDICAL GROUP | Facility: OTHER | Age: 52
End: 2022-09-19
Payer: COMMERCIAL

## 2022-09-19 ENCOUNTER — APPOINTMENT (OUTPATIENT)
Dept: RADIOLOGY | Facility: OTHER | Age: 52
End: 2022-09-19
Attending: STUDENT IN AN ORGANIZED HEALTH CARE EDUCATION/TRAINING PROGRAM
Payer: COMMERCIAL

## 2022-09-19 VITALS
DIASTOLIC BLOOD PRESSURE: 68 MMHG | WEIGHT: 130 LBS | BODY MASS INDEX: 21.66 KG/M2 | HEIGHT: 65 IN | TEMPERATURE: 98.8 F | HEART RATE: 55 BPM | SYSTOLIC BLOOD PRESSURE: 108 MMHG | OXYGEN SATURATION: 99 %

## 2022-09-19 DIAGNOSIS — M54.2 CERVICAL SPINE PAIN: ICD-10-CM

## 2022-09-19 DIAGNOSIS — F51.01 PRIMARY INSOMNIA: ICD-10-CM

## 2022-09-19 PROCEDURE — 99214 OFFICE O/P EST MOD 30 MIN: CPT | Performed by: FAMILY MEDICINE

## 2022-09-19 PROCEDURE — 72040 X-RAY EXAM NECK SPINE 2-3 VW: CPT | Mod: TC,FY | Performed by: FAMILY MEDICINE

## 2022-09-19 RX ORDER — ALPRAZOLAM 0.25 MG/1
0.25 TABLET ORAL NIGHTLY PRN
Qty: 30 TABLET | Refills: 0 | Status: SHIPPED | OUTPATIENT
Start: 2022-09-19 | End: 2023-02-06

## 2022-09-19 RX ORDER — ESTRADIOL 10 UG/1
TABLET VAGINAL
COMMUNITY
Start: 2022-07-15

## 2022-09-19 RX ORDER — VALACYCLOVIR HYDROCHLORIDE 500 MG/1
500 TABLET, FILM COATED ORAL
COMMUNITY
Start: 2022-07-15

## 2022-09-19 NOTE — PROGRESS NOTES
Subjective:   CC:   Chief Complaint   Patient presents with    Neck Pain     Neck going down the spine sore tot he touch,goes numb for about 6 months  Med follow up/diagnosed with neck/spine arthritis diagnosis 8 years ago       HPI: Cali is a 51 y.o. female who presents today for the following problems:    Problem   Cervical Spine Pain    Patient is a right handed 51-year-old female that presents today for cervical spine pain for approximately 6 months.  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  She states that the pain is mostly located in the neck but there is a lot of muscular tightness around the neck and also in the traps bilaterally.  The pain is worse on the left than the right, and she occasionally has some tingling to the upper medial shoulder but denies any arm tingling or numbness or weakness.    She denies any history of injuries but states that she had x-rays done by a chiropractor approximately 8 years ago.  Although she was asymptomatic at the time, the chiropractor told her that she already had arthritis and it was only a matter of time until she would start to develop symptoms.  She has not had any imaging since.  She has not had any surgeries to the area.    She is otherwise quite active, and activities include various types of Pilates and horseback riding.  She has been able to continue these.  She has not had any recent imaging or treatments.  She does use a Theragun self massager at home but does not take any medications, and does not ice or heat.     Insomnia    Patient was formally a patient of Dr. Jarquin but he has recently moved, and she requires a new physician.  She presents today for refill of her Xanax.  She states she has used this for many years without any adverse effects.  She is quite aware of good sleep hygiene and avoid screens prior to sleep, gets daily exercise and sufficient time outdoors.  The  bedroom is limited to sleep and sex.    She does not report much difficulty with falling asleep but states that when her life is particularly stressful she will awaken in the middle of the night and have a difficult time falling back asleep.  It is then that she uses her Xanax.  Her use is variable depending on her life stressors but she estimates between 1 and 3 times a week she will require this medication.         Current Outpatient Medications   Medication Sig Dispense Refill    YUVAFEM 10 MCG Tab INSERT1 PER VAGINALLY TWICE WEEKLY      valACYclovir (VALTREX) 500 MG Tab Take 500 mg by mouth every day.      ALPRAZolam (XANAX) 0.25 MG Tab Take 1 Tablet by mouth at bedtime as needed for Sleep for up to 30 days. 30 Tablet 0    sertraline (ZOLOFT) 50 MG Tab TAKE 1 TABLET BY MOUTH DAILY 90 Tab 4    esomeprazole (NEXIUM) 40 MG delayed-release capsule Take 1 Cap by mouth 1 time daily as needed (heartburn). 30 Cap 1    estradiol (ESTRACE) 0.1 MG/GM vaginal cream  (Patient not taking: Reported on 9/19/2022)       No current facility-administered medications for this visit.       Social History     Tobacco Use    Smoking status: Never    Smokeless tobacco: Never   Substance Use Topics    Alcohol use: Yes     Comment: Occasional    Drug use: No       Review of Systems   Constitutional:  Negative for chills, fever, malaise/fatigue and weight loss.   HENT:  Negative for congestion and sore throat.    Respiratory:  Negative for cough, sputum production and shortness of breath.    Cardiovascular:  Negative for chest pain and palpitations.   Gastrointestinal:  Negative for nausea and vomiting.   Musculoskeletal:  Positive for neck pain. Negative for falls.   Neurological:  Negative for dizziness, tingling and focal weakness.   Psychiatric/Behavioral:  Negative for depression. The patient has insomnia. The patient is not nervous/anxious.        Objective:     Vitals:    09/19/22 1126   BP: 108/68   BP Location: Right arm  "  Patient Position: Sitting   Pulse: (!) 55   Temp: 37.1 °C (98.8 °F)   SpO2: 99%   Weight: 59 kg (130 lb)   Height: 1.651 m (5' 5\")     Body mass index is 21.63 kg/m².     Physical Exam  Constitutional:       Appearance: Normal appearance. She is normal weight.   HENT:      Head: Normocephalic and atraumatic.      Nose: Nose normal.   Eyes:      Extraocular Movements: Extraocular movements intact.      Conjunctiva/sclera: Conjunctivae normal.   Cardiovascular:      Rate and Rhythm: Normal rate and regular rhythm.      Heart sounds: Normal heart sounds.   Pulmonary:      Effort: Pulmonary effort is normal.      Breath sounds: Normal breath sounds.   Neurological:      Mental Status: She is alert.      Neck:  Rotation, lateral bending, forward bending and extension all full and approximately symmetrical side to side.  There is mild muscular discomfort with these movements but no acute pain.  There is mild tenderness to palpation at the spinous processes of C6 and C7, as well as diffuse tenderness to palpation to the paraspinous muscles (worse at the base of the occiput on the left side), as well as the left trapezius, which is noted to be quite tense.  Spurling maneuver negative.    Upper extremities:  5 out of 5 strength that is symmetrical in all of the following: Shoulder shrug, shoulder abduction, forward flexion, elbow flexion extension, wrist flexion and extension, grasp strength, finger abduction.  Sensation is normal.  Wall push-up without any scapular winging, and shoulder abduction with good symmetrical scapular movement.    Assessment & Plan:   Cervical spine pain  The patient indeed has quite tense trapezius muscles.  Her Spurling maneuver is negative today but she describes what sounds like an intermittent cervical radiculopathy in the dermatomal distribution of C5.  Her strength is excellent.  Plain films in office today without any major concerns.    Encouraged her to continue her hobbies, especially " Rl, and to modify her positioning at work to include a stand-up desk.  Also encouraged her that physical therapy will be quite helpful for this scenario.  I have sent a referral to physical therapy today.  If she is not improving over the next 6 to 8 weeks or if she develops any symptoms she is encouraged to return to office and we can consider more advanced imaging, especially if neuropathic symptoms become persistent or worsen.    Insomnia  Review of PDMP shows no signs of misuse or diversion.  The patient is aware of state law, and will return to clinic for an in person appointment with Dr. Miranda for refill no sooner than 3 months from now.      Followup: No follow-ups on file.    Lupe Her MD  Patient seen on behalf of and with Dr. Miranda, attending physician.

## 2022-09-20 PROBLEM — M54.2 CERVICAL SPINE PAIN: Status: ACTIVE | Noted: 2022-09-20

## 2022-09-20 ASSESSMENT — ENCOUNTER SYMPTOMS
VOMITING: 0
COUGH: 0
SORE THROAT: 0
NAUSEA: 0
FOCAL WEAKNESS: 0
NECK PAIN: 1
SPUTUM PRODUCTION: 0
DIZZINESS: 0
SHORTNESS OF BREATH: 0
NERVOUS/ANXIOUS: 0
INSOMNIA: 1
CHILLS: 0
DEPRESSION: 0
WEIGHT LOSS: 0
TINGLING: 0
FALLS: 0
PALPITATIONS: 0
FEVER: 0

## 2022-09-20 NOTE — ASSESSMENT & PLAN NOTE
Review of PDMP shows no signs of misuse or diversion.  The patient is aware of state law, and will return to clinic for an in person appointment with Dr. Miranda for refill no sooner than 3 months from now.

## 2022-09-20 NOTE — ASSESSMENT & PLAN NOTE
The patient indeed has quite tense trapezius muscles.  Her Spurling maneuver is negative today but she describes what sounds like an intermittent cervical radiculopathy in the dermatomal distribution of C5.  Her strength is excellent.  Plain films in office today without any major concerns.    Encouraged her to continue her hobbies, especially Pilates, and to modify her positioning at work to include a stand-up desk.  Also encouraged her that physical therapy will be quite helpful for this scenario.  I have sent a referral to physical therapy today.  If she is not improving over the next 6 to 8 weeks or if she develops any symptoms she is encouraged to return to office and we can consider more advanced imaging, especially if neuropathic symptoms become persistent or worsen.

## 2022-11-08 ENCOUNTER — OFFICE VISIT (OUTPATIENT)
Dept: MEDICAL GROUP | Facility: OTHER | Age: 52
End: 2022-11-08
Payer: COMMERCIAL

## 2022-11-08 VITALS — HEIGHT: 64 IN | BODY MASS INDEX: 21.68 KG/M2 | RESPIRATION RATE: 16 BRPM | WEIGHT: 127 LBS

## 2022-11-08 DIAGNOSIS — E78.00 ELEVATED LDL CHOLESTEROL LEVEL: ICD-10-CM

## 2022-11-08 DIAGNOSIS — D75.89 MACROCYTOSIS WITHOUT ANEMIA: ICD-10-CM

## 2022-11-08 DIAGNOSIS — R73.03 PREDIABETES: ICD-10-CM

## 2022-11-08 DIAGNOSIS — Z85.6 HISTORY OF LEUKEMIA: ICD-10-CM

## 2022-11-08 DIAGNOSIS — Z78.0 MENOPAUSE: ICD-10-CM

## 2022-11-08 PROCEDURE — 99213 OFFICE O/P EST LOW 20 MIN: CPT | Mod: GC | Performed by: STUDENT IN AN ORGANIZED HEALTH CARE EDUCATION/TRAINING PROGRAM

## 2022-11-08 ASSESSMENT — PATIENT HEALTH QUESTIONNAIRE - PHQ9
1. LITTLE INTEREST OR PLEASURE IN DOING THINGS: NOT AT ALL
9. THOUGHTS THAT YOU WOULD BE BETTER OFF DEAD, OR OF HURTING YOURSELF: NOT AT ALL
SUM OF ALL RESPONSES TO PHQ QUESTIONS 1-9: 0
7. TROUBLE CONCENTRATING ON THINGS, SUCH AS READING THE NEWSPAPER OR WATCHING TELEVISION: NOT AT ALL
2. FEELING DOWN, DEPRESSED, IRRITABLE, OR HOPELESS: NOT AT ALL
SUM OF ALL RESPONSES TO PHQ9 QUESTIONS 1 AND 2: 0
4. FEELING TIRED OR HAVING LITTLE ENERGY: NOT AT ALL
5. POOR APPETITE OR OVEREATING: NOT AT ALL
6. FEELING BAD ABOUT YOURSELF - OR THAT YOU ARE A FAILURE OR HAVE LET YOURSELF OR YOUR FAMILY DOWN: NOT AL ALL
3. TROUBLE FALLING OR STAYING ASLEEP OR SLEEPING TOO MUCH: NOT AT ALL
8. MOVING OR SPEAKING SO SLOWLY THAT OTHER PEOPLE COULD HAVE NOTICED. OR THE OPPOSITE, BEING SO FIGETY OR RESTLESS THAT YOU HAVE BEEN MOVING AROUND A LOT MORE THAN USUAL: NOT AT ALL

## 2022-11-08 NOTE — ASSESSMENT & PLAN NOTE
Given that her MCV has been elevated for approximately 2 or 3 years without significant change, as well as no red flags on history, I think this is likely baseline for the patient.  However, given her history of ALL and lack of differential being performed on the CBC, I have ordered a CBC with differential I have asked her to have done in about 2 or 3 months.  She agrees to plan of care.

## 2022-11-08 NOTE — PROGRESS NOTES
Subjective:     CC: Lab review    HPI:   Cali presents today with the above chief complaint.    Problem   Elevated Ldl Cholesterol Level    Patient brings with her a lipid panel performed at a health fair.  Her total cholesterol is 212, and LDL cholesterol 117.  Her HDL cholesterol is 82, and her triglycerides are 75.  She has no history of diabetes, has never smoked, and has no history of hypertension.       Prediabetes    Patient is found to have a hemoglobin A1c of 5.8%.  She states this is the first time her hemoglobin A1c has been elevated.  She is quite active and exercises regularly, and has a healthful diet.  Her BMI is 21.8.  She did used to eat dessert on a nightly basis but cut this out approximately 4 to 5 months ago.     Macrocytosis Without Anemia    Patient presents today for review of labs obtained at [facility].  Labs included CBC without differential, CMP, hemoglobin A1c, TSH, lipid panel.    Patient's MCV 99, MCH 33.3.  Both of these are just above the upper limit of normal.  She states that her MCV has been elevated for a few years and her previous primary care physician was not worried about this.    She does have a history of ALL diagnosed at age 24.  She reports that she had some pretty intensive chemotherapy that lasted for a year, and wonders whether this might have affected her MCV.  She does not eat red meat but otherwise has no dietary restrictions and eats a diverse diet full of whole foods.  She drinks alcohol once or twice a week with a total of about 2 servings.         Current Outpatient Medications Ordered in Epic   Medication Sig Dispense Refill    YUVAFEM 10 MCG Tab INSERT1 PER VAGINALLY TWICE WEEKLY      valACYclovir (VALTREX) 500 MG Tab Take 1 Tablet by mouth every day.      sertraline (ZOLOFT) 50 MG Tab TAKE 1 TABLET BY MOUTH DAILY 90 Tab 4    esomeprazole (NEXIUM) 40 MG delayed-release capsule Take 1 Cap by mouth 1 time daily as needed (heartburn). 30 Cap 1    estradiol  "(ESTRACE) 0.1 MG/GM vaginal cream  (Patient not taking: Reported on 9/19/2022)       No current [facility]-ordered facility-administered medications on file.           ROS:  Gen: no fevers/chills, no changes in weight  Eyes: no changes in vision  ENT: no sore throat, no hearing loss, no rhinorrhea  Pulm: no sob, no cough  CV: no chest pain, no palpitations  GI: no nausea/vomiting, no diarrhea  Skin: no rash  Neuro: no headaches, no numbness/tingling  Heme/Lymph: no easy bruising, lumps or bumps      Objective:     Exam:  BP (P) 102/76 (BP Location: Right arm, Patient Position: Sitting, BP Cuff Size: Adult)   Pulse (P) 63   Temp (P) 36.6 °C (97.8 °F) (Temporal)   Resp 16   Ht 1.626 m (5' 4\")   Wt 57.6 kg (127 lb)   SpO2 (P) 97%   BMI 21.80 kg/m²  Body mass index is 21.8 kg/m².    Gen: Alert and oriented, No apparent distress.  Neck: Neck is supple without lymphadenopathy.  Lungs: Normal effort, CTA bilaterally, no wheezes, rhonchi, or rales  CV: Regular rate and rhythm. No murmurs, rubs, or gallops.  Ext: No clubbing, cyanosis, edema.        Assessment & Plan:     52 y.o. female with the following -     Problem List Items Addressed This Visit       History of leukemia    Menopause    Relevant Orders    Referral to Gynecology    Elevated LDL cholesterol level     Her 10-year ASCVD risk is estimated to be 0.7%.  We discussed that there is no indication to start a cholesterol-lowering medication at this time but we will continue to monitor an annual basis.         Prediabetes     At this point, the patient is barely in the prediabetic range.  She has a nearly optimized diet and exercise pattern and a very healthy BMI.  We discussed what her results mean, and will not start medication today but we will simply monitor on an annual or semiannual basis.         Macrocytosis without anemia     Given that her MCV has been elevated for approximately 2 or 3 years without significant change, as well as no red flags on history, " I think this is likely baseline for the patient.  However, given her history of ALL and lack of differential being performed on the CBC, I have ordered a CBC with differential I have asked her to have done in about 2 or 3 months.  She agrees to plan of care.         Relevant Orders    CBC WITH DIFFERENTIAL           Return in about 3 months (around 2/8/2023).

## 2022-11-08 NOTE — ASSESSMENT & PLAN NOTE
At this point, the patient is barely in the prediabetic range.  She has a nearly optimized diet and exercise pattern and a very healthy BMI.  We discussed what her results mean, and will not start medication today but we will simply monitor on an annual or semiannual basis.

## 2022-11-08 NOTE — ASSESSMENT & PLAN NOTE
Her 10-year ASCVD risk is estimated to be 0.7%.  We discussed that there is no indication to start a cholesterol-lowering medication at this time but we will continue to monitor an annual basis.

## 2023-01-19 ENCOUNTER — APPOINTMENT (OUTPATIENT)
Dept: MEDICAL GROUP | Facility: CLINIC | Age: 53
End: 2023-01-19
Payer: COMMERCIAL

## 2023-02-03 DIAGNOSIS — M54.2 CERVICAL SPINE PAIN: ICD-10-CM

## 2023-02-03 DIAGNOSIS — F51.01 PRIMARY INSOMNIA: ICD-10-CM

## 2023-02-06 ENCOUNTER — TELEPHONE (OUTPATIENT)
Dept: MEDICAL GROUP | Facility: CLINIC | Age: 53
End: 2023-02-06
Payer: COMMERCIAL

## 2023-02-06 DIAGNOSIS — M54.2 CERVICAL SPINE PAIN: ICD-10-CM

## 2023-02-06 DIAGNOSIS — F51.01 PRIMARY INSOMNIA: ICD-10-CM

## 2023-02-06 RX ORDER — ALPRAZOLAM 0.25 MG/1
0.25 TABLET ORAL NIGHTLY PRN
Qty: 30 TABLET | Refills: 0 | Status: SHIPPED | OUTPATIENT
Start: 2023-02-06 | End: 2023-02-06 | Stop reason: SDUPTHER

## 2023-02-06 RX ORDER — ALPRAZOLAM 0.25 MG/1
0.25 TABLET ORAL NIGHTLY PRN
Qty: 30 TABLET | Refills: 0 | Status: SHIPPED | OUTPATIENT
Start: 2023-02-06 | End: 2023-03-08

## 2023-09-28 ENCOUNTER — OFFICE VISIT (OUTPATIENT)
Dept: MEDICAL GROUP | Facility: OTHER | Age: 53
End: 2023-09-28
Payer: COMMERCIAL

## 2023-09-28 VITALS
HEART RATE: 71 BPM | BODY MASS INDEX: 21 KG/M2 | WEIGHT: 123 LBS | HEIGHT: 64 IN | SYSTOLIC BLOOD PRESSURE: 96 MMHG | OXYGEN SATURATION: 98 % | TEMPERATURE: 98.9 F | DIASTOLIC BLOOD PRESSURE: 72 MMHG

## 2023-09-28 DIAGNOSIS — F41.9 ANXIETY DISORDER, UNSPECIFIED TYPE: ICD-10-CM

## 2023-09-28 PROCEDURE — 3074F SYST BP LT 130 MM HG: CPT | Performed by: FAMILY MEDICINE

## 2023-09-28 PROCEDURE — 99213 OFFICE O/P EST LOW 20 MIN: CPT | Performed by: FAMILY MEDICINE

## 2023-09-28 PROCEDURE — 3078F DIAST BP <80 MM HG: CPT | Performed by: FAMILY MEDICINE

## 2023-09-28 RX ORDER — HYDROXYZINE HYDROCHLORIDE 25 MG/1
25 TABLET, FILM COATED ORAL 3 TIMES DAILY PRN
Qty: 30 TABLET | Refills: 2 | Status: SHIPPED | OUTPATIENT
Start: 2023-09-28

## 2023-09-28 ASSESSMENT — PATIENT HEALTH QUESTIONNAIRE - PHQ9: CLINICAL INTERPRETATION OF PHQ2 SCORE: 0

## 2023-10-09 ASSESSMENT — ENCOUNTER SYMPTOMS
NEUROLOGICAL NEGATIVE: 1
CONSTITUTIONAL NEGATIVE: 1
PSYCHIATRIC NEGATIVE: 1
RESPIRATORY NEGATIVE: 1
EYES NEGATIVE: 1
GASTROINTESTINAL NEGATIVE: 1
CARDIOVASCULAR NEGATIVE: 1

## 2023-10-09 NOTE — PROGRESS NOTES
Subjective:   CC:   Chief Complaint   Patient presents with    Follow-Up     Medication follow up       HPI: Cali is a 52 y.o. female who presents today for the following problems:    Problem   Anxiety Disorder    Patient comes in for follow-up on her anxiety.  She initially is wanting something to help her when she has an anxiety attack.  She describes the attack is quite panicked but definitely a feeling of having to get up and get out.  She would like to have something that can help her quickly she initially was interested in Xanax but upon discussion she was open to other opportunities.  States that she has anxiety attacks may be 1-3 times a month.         Current Outpatient Medications   Medication Sig Dispense Refill    Testosterone 20 % Cream       hydrOXYzine HCl (ATARAX) 25 MG Tab Take 1 Tablet by mouth 3 times a day as needed for Anxiety. 30 Tablet 2    YUVAFEM 10 MCG Tab INSERT1 PER VAGINALLY TWICE WEEKLY      valACYclovir (VALTREX) 500 MG Tab Take 1 Tablet by mouth every day.      sertraline (ZOLOFT) 50 MG Tab TAKE 1 TABLET BY MOUTH DAILY 90 Tab 4    esomeprazole (NEXIUM) 40 MG delayed-release capsule Take 1 Cap by mouth 1 time daily as needed (heartburn). 30 Cap 1    estradiol (ESTRACE) 0.1 MG/GM vaginal cream  (Patient not taking: Reported on 9/19/2022)       No current facility-administered medications for this visit.       Social History     Tobacco Use    Smoking status: Never    Smokeless tobacco: Never   Substance Use Topics    Alcohol use: Yes     Comment: Occasional    Drug use: No       Review of Systems   Constitutional: Negative.    HENT: Negative.     Eyes: Negative.    Respiratory: Negative.     Cardiovascular: Negative.    Gastrointestinal: Negative.    Skin: Negative.    Neurological: Negative.    Psychiatric/Behavioral: Negative.           Objective:     Vitals:    09/28/23 1113   BP: 96/72   BP Location: Left arm   Patient Position: Sitting   Pulse: 71   Temp: 37.2 °C (98.9 °F)  "  TempSrc: Temporal   SpO2: 98%   Weight: 55.8 kg (123 lb)   Height: 1.626 m (5' 4\")     Body mass index is 21.11 kg/m².     Physical Exam  Vitals reviewed.   Constitutional:       Appearance: Normal appearance.   HENT:      Head: Normocephalic and atraumatic.      Right Ear: Tympanic membrane normal.      Left Ear: Tympanic membrane normal.   Cardiovascular:      Rate and Rhythm: Normal rate and regular rhythm.      Pulses: Normal pulses.      Heart sounds: Normal heart sounds.   Pulmonary:      Effort: Pulmonary effort is normal.      Breath sounds: Normal breath sounds.   Skin:     General: Skin is warm.   Neurological:      General: No focal deficit present.      Mental Status: She is alert and oriented to person, place, and time.   Psychiatric:         Mood and Affect: Mood normal.         Behavior: Behavior normal.          Assessment & Plan:   Anxiety disorder  Good to go ahead and try some hydroxyzine.  We will start a low-dose first 25 mg and we will go up if she does not get the anxiety help she is after.  We will have her follow-up with me in about a month to 2 months      Followup: Return in about 6 weeks (around 11/9/2023), or if symptoms worsen or fail to improve.    Claudy Miranda M.D.    Please note that this dictation was created using voice recognition software. I have made every reasonable attempt to correct obvious errors, but I expect that there are errors of grammar and possibly content that I did not discover before finalizing the note.  "

## 2023-10-09 NOTE — ASSESSMENT & PLAN NOTE
Good to go ahead and try some hydroxyzine.  We will start a low-dose first 25 mg and we will go up if she does not get the anxiety help she is after.  We will have her follow-up with me in about a month to 2 months

## 2024-04-05 ENCOUNTER — APPOINTMENT (OUTPATIENT)
Dept: MEDICAL GROUP | Facility: CLINIC | Age: 54
End: 2024-04-05
Payer: COMMERCIAL

## 2024-04-30 ENCOUNTER — OFFICE VISIT (OUTPATIENT)
Dept: MEDICAL GROUP | Facility: CLINIC | Age: 54
End: 2024-04-30
Payer: COMMERCIAL

## 2024-04-30 VITALS
SYSTOLIC BLOOD PRESSURE: 102 MMHG | OXYGEN SATURATION: 98 % | RESPIRATION RATE: 16 BRPM | BODY MASS INDEX: 20.83 KG/M2 | WEIGHT: 125 LBS | HEART RATE: 78 BPM | DIASTOLIC BLOOD PRESSURE: 60 MMHG | HEIGHT: 65 IN

## 2024-04-30 DIAGNOSIS — F41.9 ANXIETY DISORDER, UNSPECIFIED TYPE: ICD-10-CM

## 2024-04-30 PROCEDURE — 3074F SYST BP LT 130 MM HG: CPT | Performed by: FAMILY MEDICINE

## 2024-04-30 PROCEDURE — 99213 OFFICE O/P EST LOW 20 MIN: CPT | Performed by: FAMILY MEDICINE

## 2024-04-30 PROCEDURE — 3078F DIAST BP <80 MM HG: CPT | Performed by: FAMILY MEDICINE

## 2024-04-30 RX ORDER — ALPRAZOLAM 0.5 MG/1
0.5 TABLET ORAL 3 TIMES DAILY PRN
Qty: 30 TABLET | Refills: 0 | Status: SHIPPED | OUTPATIENT
Start: 2024-04-30 | End: 2024-05-30

## 2024-05-07 ASSESSMENT — ENCOUNTER SYMPTOMS
NEUROLOGICAL NEGATIVE: 1
RESPIRATORY NEGATIVE: 1
EYES NEGATIVE: 1
CONSTITUTIONAL NEGATIVE: 1
GASTROINTESTINAL NEGATIVE: 1
PSYCHIATRIC NEGATIVE: 1
CARDIOVASCULAR NEGATIVE: 1

## 2024-05-07 ASSESSMENT — PATIENT HEALTH QUESTIONNAIRE - PHQ9: CLINICAL INTERPRETATION OF PHQ2 SCORE: 0

## 2024-05-07 NOTE — PROGRESS NOTES
Subjective:   CC:   Chief Complaint   Patient presents with    Follow-Up     Labs/ medications        HPI: Cali is a 53 y.o. female who presents today for the following problems:    Problem   Anxiety Disorder    Patient comes in to see me today for follow-up on her anxiety.  She states that she is doing very well with her Zoloft at 50 mg daily.  She states her anxiety has gone down significantly and only gets 1 or 2 maybe at most 3 anxiety events per month.  During these times she would like to build to take something to help her.  We have in the past (our last visit at the end of September) tried hydroxyzine with little results.  She has in the past tried Xanax and is asking for a refill of it.  She states that she only will use 1-2 a month as needed for her anxiety which is mostly controlled by her SSRI.  She denies any new symptoms and is doing fairly well.  Has seen her OB/GYN recently and is up-to-date on her screenings.         Current Outpatient Medications   Medication Sig Dispense Refill    ALPRAZolam (XANAX) 0.5 MG Tab Take 1 Tablet by mouth 3 times a day as needed for Anxiety for up to 30 days. 30 Tablet 0    Testosterone 20 % Cream       hydrOXYzine HCl (ATARAX) 25 MG Tab Take 1 Tablet by mouth 3 times a day as needed for Anxiety. 30 Tablet 2    YUVAFEM 10 MCG Tab INSERT1 PER VAGINALLY TWICE WEEKLY      valACYclovir (VALTREX) 500 MG Tab Take 1 Tablet by mouth every day.      sertraline (ZOLOFT) 50 MG Tab TAKE 1 TABLET BY MOUTH DAILY 90 Tab 4    esomeprazole (NEXIUM) 40 MG delayed-release capsule Take 1 Cap by mouth 1 time daily as needed (heartburn). 30 Cap 1    estradiol (ESTRACE) 0.1 MG/GM vaginal cream  (Patient not taking: Reported on 9/19/2022)       No current facility-administered medications for this visit.       Social History     Tobacco Use    Smoking status: Never    Smokeless tobacco: Never   Substance Use Topics    Alcohol use: Yes     Comment: Occasional    Drug use: No       Review of  "Systems   Constitutional: Negative.    HENT: Negative.     Eyes: Negative.    Respiratory: Negative.     Cardiovascular: Negative.    Gastrointestinal: Negative.    Skin: Negative.    Neurological: Negative.    Psychiatric/Behavioral: Negative.           Objective:     Vitals:    04/30/24 1042   BP: 102/60   BP Location: Right arm   Patient Position: Sitting   BP Cuff Size: Adult   Pulse: 78   Resp: 16   SpO2: 98%   Weight: 56.7 kg (125 lb)   Height: 1.651 m (5' 5\")     Body mass index is 20.8 kg/m².     Physical Exam  Vitals reviewed.   Constitutional:       Appearance: Normal appearance.   HENT:      Head: Normocephalic and atraumatic.   Cardiovascular:      Rate and Rhythm: Normal rate and regular rhythm.      Pulses: Normal pulses.      Heart sounds: Normal heart sounds.   Pulmonary:      Effort: Pulmonary effort is normal.      Breath sounds: Normal breath sounds.   Skin:     General: Skin is warm.   Neurological:      General: No focal deficit present.      Mental Status: She is alert and oriented to person, place, and time. Mental status is at baseline.   Psychiatric:         Mood and Affect: Mood normal.         Thought Content: Thought content normal.          Assessment & Plan:   Anxiety disorder  Patient with anxiety we will go ahead and write for the occasional use of Xanax when she has an anxiety attack and will recommend she continue using her Zoloft for prevention.  Will have her follow-up in a couple of months to see how many pills she is used.  If she needs to follow-up between now and then she can call the office and we will see her as needed.  In addition at that time we may want to think about getting another round of labs.      Followup: No follow-ups on file.    Claudy Miranda M.D.    Please note that this dictation was created using voice recognition software. I have made every reasonable attempt to correct obvious errors, but I expect that there are errors of grammar and possibly content " that I did not discover before finalizing the note.

## 2024-05-07 NOTE — ASSESSMENT & PLAN NOTE
Patient with anxiety we will go ahead and write for the occasional use of Xanax when she has an anxiety attack and will recommend she continue using her Zoloft for prevention.  Will have her follow-up in a couple of months to see how many pills she is used.  If she needs to follow-up between now and then she can call the office and we will see her as needed.  In addition at that time we may want to think about getting another round of labs.

## 2024-09-03 DIAGNOSIS — F41.9 ANXIETY DISORDER, UNSPECIFIED TYPE: ICD-10-CM

## 2024-09-04 RX ORDER — ALPRAZOLAM 0.5 MG
TABLET ORAL
Qty: 30 TABLET | Refills: 0 | Status: SHIPPED | OUTPATIENT
Start: 2024-09-04 | End: 2024-10-04

## 2025-03-24 DIAGNOSIS — F41.9 ANXIETY DISORDER, UNSPECIFIED TYPE: ICD-10-CM

## 2025-03-24 RX ORDER — ALPRAZOLAM 0.5 MG
0.5 TABLET ORAL NIGHTLY PRN
Qty: 12 TABLET | Refills: 0 | Status: SHIPPED | OUTPATIENT
Start: 2025-03-24 | End: 2025-04-05

## 2025-03-31 ENCOUNTER — APPOINTMENT (OUTPATIENT)
Dept: URBAN - METROPOLITAN AREA CLINIC 6 | Facility: CLINIC | Age: 55
Setting detail: DERMATOLOGY
End: 2025-03-31

## 2025-03-31 DIAGNOSIS — L81.4 OTHER MELANIN HYPERPIGMENTATION: ICD-10-CM

## 2025-03-31 DIAGNOSIS — D18.0 HEMANGIOMA: ICD-10-CM

## 2025-03-31 DIAGNOSIS — L57.0 ACTINIC KERATOSIS: ICD-10-CM

## 2025-03-31 DIAGNOSIS — Z71.89 OTHER SPECIFIED COUNSELING: ICD-10-CM

## 2025-03-31 DIAGNOSIS — L82.1 OTHER SEBORRHEIC KERATOSIS: ICD-10-CM

## 2025-03-31 DIAGNOSIS — D22 MELANOCYTIC NEVI: ICD-10-CM

## 2025-03-31 DIAGNOSIS — L82.0 INFLAMED SEBORRHEIC KERATOSIS: ICD-10-CM

## 2025-03-31 PROBLEM — D22.5 MELANOCYTIC NEVI OF TRUNK: Status: ACTIVE | Noted: 2025-03-31

## 2025-03-31 PROBLEM — D18.01 HEMANGIOMA OF SKIN AND SUBCUTANEOUS TISSUE: Status: ACTIVE | Noted: 2025-03-31

## 2025-03-31 PROBLEM — D22.72 MELANOCYTIC NEVI OF LEFT LOWER LIMB, INCLUDING HIP: Status: ACTIVE | Noted: 2025-03-31

## 2025-03-31 PROCEDURE — 99203 OFFICE O/P NEW LOW 30 MIN: CPT | Mod: 25

## 2025-03-31 PROCEDURE — ? SUNSCREEN RECOMMENDATIONS

## 2025-03-31 PROCEDURE — ? LIQUID NITROGEN

## 2025-03-31 PROCEDURE — ? OBSERVATION

## 2025-03-31 PROCEDURE — ? COUNSELING

## 2025-03-31 PROCEDURE — 17000 DESTRUCT PREMALG LESION: CPT | Mod: 59

## 2025-03-31 PROCEDURE — 17110 DESTRUCTION B9 LES UP TO 14: CPT

## 2025-03-31 ASSESSMENT — LOCATION SIMPLE DESCRIPTION DERM
LOCATION SIMPLE: ABDOMEN
LOCATION SIMPLE: LEFT HAND
LOCATION SIMPLE: LEFT ACHILLES SKIN
LOCATION SIMPLE: LEFT FOREHEAD
LOCATION SIMPLE: CHEST
LOCATION SIMPLE: UPPER BACK
LOCATION SIMPLE: RIGHT HAND
LOCATION SIMPLE: LEFT CHEEK

## 2025-03-31 ASSESSMENT — LOCATION DETAILED DESCRIPTION DERM
LOCATION DETAILED: LEFT ACHILLES SKIN
LOCATION DETAILED: EPIGASTRIC SKIN
LOCATION DETAILED: RIGHT RADIAL DORSAL HAND
LOCATION DETAILED: LEFT INFERIOR MEDIAL MALAR CHEEK
LOCATION DETAILED: LEFT ULNAR DORSAL HAND
LOCATION DETAILED: RIGHT MEDIAL SUPERIOR CHEST
LOCATION DETAILED: SUPERIOR THORACIC SPINE
LOCATION DETAILED: PERIUMBILICAL SKIN
LOCATION DETAILED: LEFT FOREHEAD

## 2025-03-31 ASSESSMENT — LOCATION ZONE DERM
LOCATION ZONE: TRUNK
LOCATION ZONE: LEG
LOCATION ZONE: FACE
LOCATION ZONE: HAND

## 2025-06-10 DIAGNOSIS — F41.9 ANXIETY DISORDER, UNSPECIFIED TYPE: ICD-10-CM

## 2025-06-11 NOTE — TELEPHONE ENCOUNTER
Received request via: Pharmacy    Was the patient seen in the last year in this department? No    Does the patient have an active prescription (recently filled or refills available) for medication(s) requested? No    Pharmacy Name: [facility]/pharmacy #[id number] - [place] - [id number]

## 2025-06-12 RX ORDER — ALPRAZOLAM 0.5 MG
0.5 TABLET ORAL NIGHTLY PRN
Qty: 12 TABLET | Refills: 0 | Status: SHIPPED | OUTPATIENT
Start: 2025-06-12 | End: 2025-06-24